# Patient Record
Sex: MALE | Race: WHITE | Employment: OTHER | URBAN - METROPOLITAN AREA
[De-identification: names, ages, dates, MRNs, and addresses within clinical notes are randomized per-mention and may not be internally consistent; named-entity substitution may affect disease eponyms.]

---

## 2017-01-16 ENCOUNTER — HOSPITAL ENCOUNTER (OUTPATIENT)
Dept: CV DIAGNOSTICS | Age: 67
Discharge: HOME | End: 2017-01-16
Attending: FAMILY MEDICINE
Payer: MEDICARE

## 2017-01-16 DIAGNOSIS — I10 ESSENTIAL (PRIMARY) HYPERTENSION: ICD-10-CM

## 2017-01-16 DIAGNOSIS — R94.31 ABNORMAL ELECTROCARDIOGRAM: ICD-10-CM

## 2017-01-16 DIAGNOSIS — E78.5 OTHER AND UNSPECIFIED HYPERLIPIDEMIA: ICD-10-CM

## 2017-01-16 PROCEDURE — 93306 TTE W/DOPPLER COMPLETE: CPT

## 2017-01-16 PROCEDURE — 93306 TTE W/DOPPLER COMPLETE: CPT | Performed by: INTERNAL MEDICINE

## 2019-08-06 ENCOUNTER — PROCEDURE VISIT (OUTPATIENT)
Dept: NEUROLOGY | Facility: CLINIC | Age: 69
End: 2019-08-06
Payer: MEDICARE

## 2019-08-06 ENCOUNTER — TELEPHONE (OUTPATIENT)
Dept: NEUROLOGY | Facility: CLINIC | Age: 69
End: 2019-08-06

## 2019-08-06 VITALS
SYSTOLIC BLOOD PRESSURE: 132 MMHG | RESPIRATION RATE: 16 BRPM | BODY MASS INDEX: 34.72 KG/M2 | HEART RATE: 74 BPM | DIASTOLIC BLOOD PRESSURE: 80 MMHG | HEIGHT: 66 IN | WEIGHT: 216 LBS

## 2019-08-06 DIAGNOSIS — G56.03 BILATERAL CARPAL TUNNEL SYNDROME: Primary | ICD-10-CM

## 2019-08-06 PROCEDURE — 95885 MUSC TST DONE W/NERV TST LIM: CPT | Performed by: OTHER

## 2019-08-06 PROCEDURE — 95910 NRV CNDJ TEST 7-8 STUDIES: CPT | Performed by: OTHER

## 2019-08-06 RX ORDER — TESTOSTERONE 20.25 MG/1.25G
GEL TOPICAL
COMMUNITY

## 2019-08-06 RX ORDER — ASCORBIC ACID 1000 MG
TABLET ORAL
COMMUNITY

## 2019-08-06 RX ORDER — BENAZEPRIL HYDROCHLORIDE 20 MG/1
20 TABLET ORAL DAILY
COMMUNITY

## 2019-08-06 RX ORDER — OMEGA-3-ACID ETHYL ESTERS 1 G/1
1 CAPSULE, LIQUID FILLED ORAL DAILY
COMMUNITY

## 2019-08-06 NOTE — PROCEDURES
MARILU POWERS [facility] [address]  [phone]    Fax  [phone]    Electrophysiologic Consultation      Patient: Augustine Maldonado      8/6/20 L. ABD POLL BREVIS N None None None None N N N N   R. ABD [address] None 1-2+ None None N N N N   R. OPPONENS SOLIS N None None None  N N N N   Bilateral FDI N None None None  N N N N

## 2019-08-06 NOTE — H&P
[facility]   Neurology; INITIAL CLINIC VISIT  2019, 10:43 AM     [place] Patient Status:  No patient class for patient encounter    12/15/1950 MRN [id number]   Location [address] [place] [address] Attending No att. Raghavendra Oneal and it is not helping.       PHYSICAL EXAMINATION:  /80 (BP Location: Left arm, Patient Position: Sitting, Cuff Size: large)   Pulse 74   Resp 16   Ht 66\"   Wt 216 lb   BMI 34.86 kg/m²   Looks stated age, PC AS  No LAD, no TM  Lungs CTA  Heart S1S2

## 2019-08-06 NOTE — PROCEDURES
Reason for test:  Carpal Tunnel Syndrome bilaterally      RESULTS:  1.  Bilateral Median distal motor latencies were delayed. Amplitudes were still relatively preserved  2. Absent Median sensory responses  3.   EMG showed denervation potentials on the rig

## 2019-08-27 ENCOUNTER — TELEPHONE (OUTPATIENT)
Dept: NEUROLOGY | Facility: CLINIC | Age: 69
End: 2019-08-27

## 2019-08-27 NOTE — TELEPHONE ENCOUNTER
Pt had MRI test results outside [facility]. Would like Dr. Baldwin Neighbor opinion of them. Advised pt to mail disc and fax test results from [employer] to review. Pt understood and would follow through.      Call pt once receive disc and written resul

## 2019-08-29 NOTE — TELEPHONE ENCOUNTER
Uploaded images from [facility] 8/5/19 MRI C-/L-spine. Confirmed images received by radiology. Faxed copies of reports to radiology. CD placed in Dr. Ghassan gonzales to review with reports. No need to return disc.

## 2019-09-12 NOTE — TELEPHONE ENCOUNTER
Spoke with patient and gave my advise re MRI findings. CTS surgery and other options,  Splints not helping.    Dr Jermain Morrison

## 2022-11-28 ENCOUNTER — HOSPITAL ENCOUNTER (OUTPATIENT)
Dept: CV DIAGNOSTICS | Age: 72
Discharge: HOME | End: 2022-11-28
Attending: FAMILY MEDICINE
Payer: MEDICARE

## 2022-11-28 DIAGNOSIS — I10 ESSENTIAL (PRIMARY) HYPERTENSION: ICD-10-CM

## 2022-11-28 DIAGNOSIS — I45.10 RBBB: ICD-10-CM

## 2022-11-28 DIAGNOSIS — E78.5 HYPERLIPIDEMIA, UNSPECIFIED: ICD-10-CM

## 2022-11-28 PROCEDURE — 93306 TTE W/DOPPLER COMPLETE: CPT | Performed by: FAMILY MEDICINE

## 2023-07-06 ENCOUNTER — OFFICE VISIT (OUTPATIENT)
Dept: HEMATOLOGY/ONCOLOGY | Age: 73
End: 2023-07-06
Attending: INTERNAL MEDICINE
Payer: MEDICARE

## 2023-07-06 VITALS
DIASTOLIC BLOOD PRESSURE: 74 MMHG | BODY MASS INDEX: 32.03 KG/M2 | HEIGHT: 68 IN | HEART RATE: 80 BPM | RESPIRATION RATE: 18 BRPM | SYSTOLIC BLOOD PRESSURE: 120 MMHG | WEIGHT: 211.31 LBS | TEMPERATURE: 98 F | OXYGEN SATURATION: 95 %

## 2023-07-06 DIAGNOSIS — R79.89 ELEVATED FERRITIN LEVEL: Primary | ICD-10-CM

## 2023-07-06 LAB
DEPRECATED HBV CORE AB SER IA-ACNC: 579.4 NG/ML
IRON SATN MFR SERPL: 37 %
IRON SERPL-MCNC: 95 UG/DL
TIBC SERPL-MCNC: 259 UG/DL (ref 240–450)
TRANSFERRIN SERPL-MCNC: 174 MG/DL (ref 200–360)

## 2023-07-06 PROCEDURE — 99204 OFFICE O/P NEW MOD 45 MIN: CPT | Performed by: INTERNAL MEDICINE

## 2023-07-06 NOTE — PROGRESS NOTES
Patient is here for consultation for high iron levels in his blood. He has never taken iron supplements. He has never previously been told that his blood levels were abnormal.    He generally feels well. He eats well. He would like to have more energy. He used to take testosterone but stopped a year ago after it had no real affect on how he felt. He has some problems with his knees and may eventually need a knee replacement. He has had some knee procedures. He doesn't have much pain in his knees. He recently had bronchitis and was treated by PCP. This was around the time of the air quality alerts.       Education Record    Learner:  Patient    Disease / Diagnosis: elevated iron level    Barriers / Limitations:  None   Comments:    Method:  Discussion   Comments:    General Topics:  Other:  reason for consultation and history   Comments:    Outcome:  Shows understanding   Comments:

## 2023-07-11 ENCOUNTER — TELEPHONE (OUTPATIENT)
Dept: HEMATOLOGY/ONCOLOGY | Age: 73
End: 2023-07-11